# Patient Record
Sex: MALE | Employment: FULL TIME | URBAN - METROPOLITAN AREA
[De-identification: names, ages, dates, MRNs, and addresses within clinical notes are randomized per-mention and may not be internally consistent; named-entity substitution may affect disease eponyms.]

---

## 2019-05-02 ENCOUNTER — OFFICE VISIT (OUTPATIENT)
Dept: OPTOMETRY | Facility: CLINIC | Age: 44
End: 2019-05-02
Payer: COMMERCIAL

## 2019-05-02 DIAGNOSIS — Z01.00 ENCOUNTER FOR EXAMINATION OF EYES AND VISION WITHOUT ABNORMAL FINDINGS: Primary | ICD-10-CM

## 2019-05-02 DIAGNOSIS — H52.222 REGULAR ASTIGMATISM OF LEFT EYE: ICD-10-CM

## 2019-05-02 DIAGNOSIS — H52.03 HYPERMETROPIA, BILATERAL: ICD-10-CM

## 2019-05-02 DIAGNOSIS — H52.4 PRESBYOPIA: ICD-10-CM

## 2019-05-02 PROCEDURE — 92015 DETERMINE REFRACTIVE STATE: CPT | Performed by: OPTOMETRIST

## 2019-05-02 PROCEDURE — 92004 COMPRE OPH EXAM NEW PT 1/>: CPT | Performed by: OPTOMETRIST

## 2019-05-02 ASSESSMENT — REFRACTION_WEARINGRX
OS_CYLINDER: SPHERE
SPECS_TYPE: READING ONLY
OD_CYLINDER: SPHERE
OD_SPHERE: +1.00
OD_CYLINDER: SPHERE
OS_CYLINDER: SPHERE
OD_ADD: +1.00
OD_SPHERE: PLANO
OS_ADD: +1.00
OS_SPHERE: PLANO
OS_SPHERE: +1.00

## 2019-05-02 ASSESSMENT — EXTERNAL EXAM - LEFT EYE: OS_EXAM: NORMAL

## 2019-05-02 ASSESSMENT — SLIT LAMP EXAM - LIDS
COMMENTS: NORMAL
COMMENTS: NORMAL

## 2019-05-02 ASSESSMENT — CONF VISUAL FIELD
OD_NORMAL: 1
OS_NORMAL: 1

## 2019-05-02 ASSESSMENT — REFRACTION_MANIFEST
OS_AXIS: 020
OD_ADD: +2.00
OS_CYLINDER: +0.50
OD_SPHERE: +0.50
OS_SPHERE: +1.00
OD_CYLINDER: SPHERE
OS_ADD: +2.00

## 2019-05-02 ASSESSMENT — TONOMETRY
OD_IOP_MMHG: 17
OS_IOP_MMHG: 18
IOP_METHOD: APPLANATION

## 2019-05-02 ASSESSMENT — VISUAL ACUITY
OS_SC: 20/120
METHOD: SNELLEN - LINEAR
OS_SC+: -1
OS_SC: 20/25
OD_SC: 20/20
OD_SC: 20/120

## 2019-05-02 ASSESSMENT — CUP TO DISC RATIO
OD_RATIO: 0.3
OS_RATIO: 0.25

## 2019-05-02 ASSESSMENT — EXTERNAL EXAM - RIGHT EYE: OD_EXAM: NORMAL

## 2019-05-02 NOTE — LETTER
5/2/2019         RE: Darren Pa  [address] [facility] [address]        Dear Colleague,    Thank you for referring your patient, Darren Pa, to the [facility]. Please see a copy of my visit note below.    Chief Complaint   Patient presents with     Annual Eye Exam      Accompanied by self  Last Eye Exam: 4 months-patient has lasik consult-patient decided not to have lasik-patient didn't get a rx for glasses. done.patient had full exam 2 years ago.  Dilated Previously: Yes    What are you currently using to see?  Readers-+2.50       Distance Vision Acuity: Noticed gradual change in both eyes    Near Vision Acuity: Not satisfied     Eye Comfort: dry and itchy  Do you use eye drops? : Yes: systane  Occupation or Hobbies: [email or web address], [employer]          Medical, surgical and family histories reviewed and updated 5/2/2019.       OBJECTIVE: See Ophthalmology exam    ASSESSMENT:    ICD-10-CM    1. Encounter for examination of eyes and vision without abnormal findings Z01.00 EYE EXAM (SIMPLE-NONBILLABLE)   2. Hypermetropia, bilateral H52.03 EYE EXAM (SIMPLE-NONBILLABLE)     REFRACTION   3. Regular astigmatism of left eye H52.222 EYE EXAM (SIMPLE-NONBILLABLE)     REFRACTION   4. Presbyopia H52.4 EYE EXAM (SIMPLE-NONBILLABLE)     REFRACTION      PLAN:     Patient Instructions   Darren was advised of today's exam findings.  Optional to fill new glasses prescription, mild glasses prescription   Okay to continue use of over-the-counter reading glasses. Recommend +2.50 or +2.75 power.   Copy of glasses Rx provided today.  Return in 2 years for eye exam, or sooner if needed.    The affects of the dilating drops last for 4- 6 hours.  You will be more sensitive to light and vision will be blurry up close.  Mydriatic sunglasses were given if needed.      Amado Francis O.D.  [facility]  [address] [place] [address]. [place]  [address] [phone]           Again, thank you for allowing me to participate in the care of your patient.        Sincerely,        Amado Francis OD

## 2019-05-02 NOTE — PROGRESS NOTES
Chief Complaint   Patient presents with     Annual Eye Exam      Accompanied by self  Last Eye Exam: 4 months-patient has lasik consult-patient decided not to have lasik-patient didn't get a rx for glasses. done.patient had full exam 2 years ago.  Dilated Previously: Yes    What are you currently using to see?  Readers-+2.50       Distance Vision Acuity: Noticed gradual change in both eyes    Near Vision Acuity: Not satisfied     Eye Comfort: dry and itchy  Do you use eye drops? : Yes: systane  Occupation or Hobbies: manufacturing    [employer], [employer]          Medical, surgical and family histories reviewed and updated 5/2/2019.       OBJECTIVE: See Ophthalmology exam    ASSESSMENT:    ICD-10-CM    1. Encounter for examination of eyes and vision without abnormal findings Z01.00 EYE EXAM (SIMPLE-NONBILLABLE)   2. Hypermetropia, bilateral H52.03 EYE EXAM (SIMPLE-NONBILLABLE)     REFRACTION   3. Regular astigmatism of left eye H52.222 EYE EXAM (SIMPLE-NONBILLABLE)     REFRACTION   4. Presbyopia H52.4 EYE EXAM (SIMPLE-NONBILLABLE)     REFRACTION      PLAN:     Patient Instructions   Darren was advised of today's exam findings.  Optional to fill new glasses prescription, mild glasses prescription   Okay to continue use of over-the-counter reading glasses. Recommend +2.50 or +2.75 power.   Copy of glasses Rx provided today.  Return in 2 years for eye exam, or sooner if needed.    The affects of the dilating drops last for 4- 6 hours.  You will be more sensitive to light and vision will be blurry up close.  Mydriatic sunglasses were given if needed.      Amado Francis O.D.  [address]. [facility] [place]  [address]    [phone]

## 2019-05-02 NOTE — PATIENT INSTRUCTIONS
Darren was advised of today's exam findings.  Optional to fill new glasses prescription, mild glasses prescription   Okay to continue use of over-the-counter reading glasses. Recommend +2.50 or +2.75 power.   Copy of glasses Rx provided today.  Return in 2 years for eye exam, or sooner if needed.    The affects of the dilating drops last for 4- 6 hours.  You will be more sensitive to light and vision will be blurry up close.  Mydriatic sunglasses were given if needed.      Amado Francis O.D.  [address]  [place]  [address]    [phone]

## 2020-02-23 ENCOUNTER — HEALTH MAINTENANCE LETTER (OUTPATIENT)
Age: 45
End: 2020-02-23

## 2020-08-12 ENCOUNTER — OFFICE VISIT (OUTPATIENT)
Dept: OPTOMETRY | Facility: CLINIC | Age: 45
End: 2020-08-12
Payer: COMMERCIAL

## 2020-08-12 DIAGNOSIS — H52.4 PRESBYOPIA: ICD-10-CM

## 2020-08-12 DIAGNOSIS — Z01.00 ENCOUNTER FOR EXAMINATION OF EYES AND VISION WITHOUT ABNORMAL FINDINGS: Primary | ICD-10-CM

## 2020-08-12 DIAGNOSIS — H52.222 REGULAR ASTIGMATISM OF LEFT EYE: ICD-10-CM

## 2020-08-12 DIAGNOSIS — H52.02 HYPERMETROPIA OF LEFT EYE: ICD-10-CM

## 2020-08-12 PROCEDURE — 92014 COMPRE OPH EXAM EST PT 1/>: CPT | Performed by: OPTOMETRIST

## 2020-08-12 ASSESSMENT — TONOMETRY
IOP_METHOD: APPLANATION
OD_IOP_MMHG: 19
OS_IOP_MMHG: 18

## 2020-08-12 ASSESSMENT — CONF VISUAL FIELD
METHOD: COUNTING FINGERS
OD_NORMAL: 1
OS_NORMAL: 1

## 2020-08-12 ASSESSMENT — REFRACTION_MANIFEST
OS_CYLINDER: +0.50
OD_ADD: +2.00
OS_AXIS: 180
OS_SPHERE: +1.25
OD_CYLINDER: SPHERE
OD_SPHERE: PLANO
OS_ADD: +2.00

## 2020-08-12 ASSESSMENT — VISUAL ACUITY
METHOD: SNELLEN - LINEAR
OD_SC: 20/20
OS_SC: 20/30

## 2020-08-12 ASSESSMENT — CUP TO DISC RATIO
OS_RATIO: 0.25
OD_RATIO: 0.3

## 2020-08-12 ASSESSMENT — EXTERNAL EXAM - RIGHT EYE: OD_EXAM: NORMAL

## 2020-08-12 ASSESSMENT — SLIT LAMP EXAM - LIDS
COMMENTS: NORMAL
COMMENTS: NORMAL

## 2020-08-12 ASSESSMENT — EXTERNAL EXAM - LEFT EYE: OS_EXAM: NORMAL

## 2020-08-12 NOTE — PATIENT INSTRUCTIONS
Darren was advised of today's exam findings.  Fill glasses prescription  Allow 2 weeks to adapt to change in glasses  Copy of glasses Rx provided today. Bifocal prescription and reading-only, per patient request.     Return in 2 years for eye exam, or sooner if needed.    The effects of the dilating drops last for 4- 6 hours.  You will be more sensitive to light and vision will be blurry up close.  Mydriatic sunglasses were given if needed.      Amado Francis O.D.  [address] [place]  [address]    [phone]

## 2020-08-12 NOTE — LETTER
8/12/2020         RE: Darren Pa  [address] [facility] [address]        Dear Colleague,    Thank you for referring your patient, Darren Pa, to the [facility]. Please see a copy of my visit note below.    Chief Complaint   Patient presents with     COMPREHENSIVE EYE EXAM       Last Eye Exam: 5/2/19  Dilated Previously: Yes    What are you currently using to see?  Uses OTC readers +2.50 for near.    Distance Vision Acuity: Satisfied with vision    Near Vision Acuity: Not satisfied, Started having headaches when reading for last 3 months.    Eye Comfort: good  Do you use eye drops? : No  Occupation or Hobbies: Work at assembly company        Medical, surgical and family histories reviewed and updated 8/12/2020.       OBJECTIVE: See Ophthalmology exam    ASSESSMENT:    ICD-10-CM    1. Encounter for examination of eyes and vision without abnormal findings  Z01.00    2. Hypermetropia of left eye  H52.02    3. Regular astigmatism of left eye  H52.222    4. Presbyopia  H52.4       PLAN:    Darrenruba Pa aware  eye exam results will be sent to [facility]  Patient Instructions   Darren was advised of today's exam findings.  Fill glasses prescription  Allow 2 weeks to adapt to change in glasses  Copy of glasses Rx provided today. Bifocal prescription and reading-only, per patient request.     Return in 2 years for eye exam, or sooner if needed.    The effects of the dilating drops last for 4- 6 hours.  You will be more sensitive to light and vision will be blurry up close.  Mydriatic sunglasses were given if needed.      Amado Francis O.D.  [address]  [place], [place]  [address] [phone]               Again, thank you for allowing me to participate in the care of your patient.        Sincerely,        Amado Francis, SANDIE

## 2020-08-12 NOTE — PROGRESS NOTES
Chief Complaint   Patient presents with     COMPREHENSIVE EYE EXAM       Last Eye Exam: 5/2/19  Dilated Previously: Yes    What are you currently using to see?  Uses OTC readers +2.50 for near.    Distance Vision Acuity: Satisfied with vision    Near Vision Acuity: Not satisfied, Started having headaches when reading for last 3 months.    Eye Comfort: good  Do you use eye drops? : No  Occupation or Hobbies: Work at assembly company        Medical, surgical and family histories reviewed and updated 8/12/2020.       OBJECTIVE: See Ophthalmology exam    ASSESSMENT:    ICD-10-CM    1. Encounter for examination of eyes and vision without abnormal findings  Z01.00    2. Hypermetropia of left eye  H52.02    3. Regular astigmatism of left eye  H52.222    4. Presbyopia  H52.4       PLAN:    Darren Pa aware  eye exam results will be sent to [facility]  Patient Instructions   Darren was advised of today's exam findings.  Fill glasses prescription  Allow 2 weeks to adapt to change in glasses  Copy of glasses Rx provided today. Bifocal prescription and reading-only, per patient request.     Return in 2 years for eye exam, or sooner if needed.    The effects of the dilating drops last for 4- 6 hours.  You will be more sensitive to light and vision will be blurry up close.  Mydriatic sunglasses were given if needed.      Amado Francis O.D.  [address]  [address]    [phone]

## 2020-12-12 ENCOUNTER — HEALTH MAINTENANCE LETTER (OUTPATIENT)
Age: 45
End: 2020-12-12

## 2021-04-11 ENCOUNTER — HEALTH MAINTENANCE LETTER (OUTPATIENT)
Age: 46
End: 2021-04-11

## 2021-09-26 ENCOUNTER — HEALTH MAINTENANCE LETTER (OUTPATIENT)
Age: 46
End: 2021-09-26

## 2022-05-08 ENCOUNTER — HEALTH MAINTENANCE LETTER (OUTPATIENT)
Age: 47
End: 2022-05-08

## 2023-01-08 ENCOUNTER — HEALTH MAINTENANCE LETTER (OUTPATIENT)
Age: 48
End: 2023-01-08

## 2023-06-02 ENCOUNTER — HEALTH MAINTENANCE LETTER (OUTPATIENT)
Age: 48
End: 2023-06-02